# Patient Record
Sex: MALE | NOT HISPANIC OR LATINO | Employment: OTHER | URBAN - METROPOLITAN AREA
[De-identification: names, ages, dates, MRNs, and addresses within clinical notes are randomized per-mention and may not be internally consistent; named-entity substitution may affect disease eponyms.]

---

## 2017-11-27 ENCOUNTER — OFFICE VISIT (OUTPATIENT)
Dept: URGENT CARE | Facility: CLINIC | Age: 77
End: 2017-11-27
Payer: COMMERCIAL

## 2017-11-27 ENCOUNTER — APPOINTMENT (OUTPATIENT)
Dept: RADIOLOGY | Facility: CLINIC | Age: 77
End: 2017-11-27
Payer: COMMERCIAL

## 2017-11-27 DIAGNOSIS — R07.81 PLEURODYNIA: ICD-10-CM

## 2017-11-27 PROCEDURE — 71101 X-RAY EXAM UNILAT RIBS/CHEST: CPT

## 2017-11-27 PROCEDURE — 99203 OFFICE O/P NEW LOW 30 MIN: CPT

## 2017-11-28 NOTE — PROGRESS NOTES
Assessment    1  Rib pain on right side (786 50) (R07 81)    Plan  Rib pain on right side    · Baclofen 10 MG Oral Tablet; TAKE 1 TABLET 3 times daily   · Hydrocodone-Acetaminophen 5-325 MG Oral Tablet; TAKE 1 TABLET EVERY 6HOURS AS NEEDED FOR PAIN   · * XR RIBS RIGHT W PA CHEST MIN 3 VIEWS; Status:Active - Retrospective Authorization; Requested for:[id number];     Discussion/Summary  Discussion Summary:   Continue Advil every 6 hours  Alternate ice and heat to the rib area  Medication Side Effects Reviewed: Possible side effects of new medications were reviewed with the patient/guardian today  Understands and agrees with treatment plan: The treatment plan was reviewed with the patient/guardian  The patient/guardian understands and agrees with the treatment plan   Follow Up Instructions: Follow Up with your Primary Care Provider in 4-5 days  If your symptoms worsen, go to the nearest [place] [address] Emergency Department  Chief Complaint    1  Pain  Chief Complaint Free Text Note Form: Pt stated that this past saturday he was getting into his tractor trailer when his hand slipped of the barhold and he swung full force into the truck with his right side  he has lateral/posterior mid right rib pain  History of Present Illness  HPI: Pt stated that this past saturday he was getting into his tractor trailer when his hand slipped of the barhold and he swung full force into the truck with his right side  he has lateral/posterior mid right rib pain  Patient has been taking Advil 1 to 2 times a day, with minimal relief  Review of Systems  Focused-Male:  Constitutional: no fever or chills, feels well, no tiredness, no recent weight loss or weight gain  Musculoskeletal: as noted in HPI  Active Problems  1  Rib pain on right side (786 50) (R07 81)    Past Medical History  1  History of hypertension (V12 59) (Z86 79)   2   History of hypothyroidism (V12 29) (Z86 39)  Active Problems And Past Medical History Reviewed: The active problems and past medical history were reviewed and updated today  Current Meds  Medication List Reviewed: The medication list was reviewed and updated today  Allergies    1  No Known Drug Allergies    Vitals  Signs   Recorded: 27Nov2017 10:21AM   Temperature: 97 F, Tympanic  Heart Rate: 84  Respiration: 20  Systolic: 944  Diastolic: 80  Height: 5 ft 4 in  Weight: 184 lb   BMI Calculated: 31 58  BSA Calculated: 1 89  O2 Saturation: 97  Pain Scale: 10    Physical Exam   Constitutional  General appearance: No acute distress, well appearing and well nourished  Pulmonary  Respiratory effort: No increased work of breathing or signs of respiratory distress  Auscultation of lungs: Clear to auscultation  Cardiovascular  Auscultation of heart: Normal rate and rhythm, normal S1 and S2, without murmurs  Musculoskeletal  Inspection/palpation of joints, bones, and muscles: Abnormal  -- No ecchymosis of the right lateral rib area  But tender to mild palpation of the lateral ribs between rib 7 and 10  Increased muscle tone in the area        Signatures   Electronically signed by : SHANICE Lopez ; Nov 27 2017 10:55AM EST                       (Author)

## 2018-04-26 LAB
ALBUMIN SERPL BCP-MCNC: 4.5 G/DL (ref 3.5–5.7)
ALP SERPL-CCNC: 111 IU/L (ref 55–165)
ALT SERPL W P-5'-P-CCNC: 27 IU/L (ref 7–29)
ANION GAP SERPL CALCULATED.3IONS-SCNC: 12.5 MM/L
AST SERPL W P-5'-P-CCNC: 21 U/L (ref 8–27)
BILIRUB SERPL-MCNC: 0.6 MG/DL (ref 0.3–1)
BUN SERPL-MCNC: 25 MG/DL (ref 7–25)
CALCIUM SERPL-MCNC: 9.6 MG/DL (ref 8.6–10.5)
CHLORIDE SERPL-SCNC: 103 MM/L (ref 98–107)
CHOLEST SERPL-MCNC: 160 MG/DL (ref 0–200)
CO2 SERPL-SCNC: 29 MM/L (ref 21–31)
CREAT SERPL-MCNC: 1.18 MG/DL (ref 0.7–1.3)
EGFR (HISTORICAL): 60 GFR
EGFR AFRICAN AMERICAN (HISTORICAL): > 60 GFR
GLUCOSE (HISTORICAL): 117 MG/DL (ref 65–99)
HDLC SERPL-MCNC: 42 MG/DL (ref 40–60)
LDLC SERPL CALC-MCNC: 93.1 MG/DL (ref 75–193)
OSMOLALITY, SERUM (HISTORICAL): 285 MOSM (ref 262–291)
POTASSIUM SERPL-SCNC: 4.5 MM/L (ref 3.5–5.5)
SODIUM SERPL-SCNC: 140 MM/L (ref 134–143)
TOTAL PROTEIN (HISTORICAL): 7.1 G/DL (ref 6.4–8.9)
TRIGL SERPL-MCNC: 127 MG/DL (ref 44–166)
TSH SERPL DL<=0.05 MIU/L-ACNC: 1.38 UIU/M (ref 0.45–5.33)
VLDL CHOLESTEROL (HISTORICAL): 25 MG/DL (ref 5–51)

## 2018-04-27 LAB
EST. AVERAGE GLUCOSE BLD GHB EST-MCNC: 178 MG/DL
HBA1C MFR BLD HPLC: 7.8 % (ref 4–6.2)